# Patient Record
Sex: FEMALE | Race: BLACK OR AFRICAN AMERICAN | NOT HISPANIC OR LATINO | URBAN - METROPOLITAN AREA
[De-identification: names, ages, dates, MRNs, and addresses within clinical notes are randomized per-mention and may not be internally consistent; named-entity substitution may affect disease eponyms.]

---

## 2019-03-01 ENCOUNTER — INPATIENT (INPATIENT)
Facility: HOSPITAL | Age: 64
LOS: 2 days | Discharge: HOME | DRG: 247 | End: 2019-03-04
Attending: INTERNAL MEDICINE | Admitting: INTERNAL MEDICINE
Payer: COMMERCIAL

## 2019-03-01 ENCOUNTER — TRANSCRIPTION ENCOUNTER (OUTPATIENT)
Age: 64
End: 2019-03-01

## 2019-03-01 VITALS
HEART RATE: 64 BPM | DIASTOLIC BLOOD PRESSURE: 93 MMHG | OXYGEN SATURATION: 93 % | TEMPERATURE: 98 F | RESPIRATION RATE: 12 BRPM | SYSTOLIC BLOOD PRESSURE: 150 MMHG

## 2019-03-01 DIAGNOSIS — Z90.721 ACQUIRED ABSENCE OF OVARIES, UNILATERAL: Chronic | ICD-10-CM

## 2019-03-01 DIAGNOSIS — Z98.890 OTHER SPECIFIED POSTPROCEDURAL STATES: Chronic | ICD-10-CM

## 2019-03-01 DIAGNOSIS — I21.4 NON-ST ELEVATION (NSTEMI) MYOCARDIAL INFARCTION: ICD-10-CM

## 2019-03-01 LAB
ANION GAP SERPL CALC-SCNC: 13 MMOL/L — SIGNIFICANT CHANGE UP (ref 5–17)
APTT BLD: 37 SEC — HIGH (ref 27.5–36.3)
BLD GP AB SCN SERPL QL: SIGNIFICANT CHANGE UP
BUN SERPL-MCNC: 10 MG/DL — SIGNIFICANT CHANGE UP (ref 8–20)
CALCIUM SERPL-MCNC: 9.3 MG/DL — SIGNIFICANT CHANGE UP (ref 8.6–10.2)
CHLORIDE SERPL-SCNC: 99 MMOL/L — SIGNIFICANT CHANGE UP (ref 98–107)
CO2 SERPL-SCNC: 26 MMOL/L — SIGNIFICANT CHANGE UP (ref 22–29)
CREAT SERPL-MCNC: 0.78 MG/DL — SIGNIFICANT CHANGE UP (ref 0.5–1.3)
GLUCOSE SERPL-MCNC: 76 MG/DL — SIGNIFICANT CHANGE UP (ref 70–115)
HCT VFR BLD CALC: 48.2 % — HIGH (ref 37–47)
HGB BLD-MCNC: 16.1 G/DL — HIGH (ref 12–16)
INR BLD: 1.04 RATIO — SIGNIFICANT CHANGE UP (ref 0.88–1.16)
MCHC RBC-ENTMCNC: 29 PG — SIGNIFICANT CHANGE UP (ref 27–31)
MCHC RBC-ENTMCNC: 33.4 G/DL — SIGNIFICANT CHANGE UP (ref 32–36)
MCV RBC AUTO: 86.8 FL — SIGNIFICANT CHANGE UP (ref 81–99)
PLATELET # BLD AUTO: 425 K/UL — HIGH (ref 150–400)
POTASSIUM SERPL-MCNC: 4.1 MMOL/L — SIGNIFICANT CHANGE UP (ref 3.5–5.3)
POTASSIUM SERPL-SCNC: 4.1 MMOL/L — SIGNIFICANT CHANGE UP (ref 3.5–5.3)
PROTHROM AB SERPL-ACNC: 12 SEC — SIGNIFICANT CHANGE UP (ref 10–12.9)
RBC # BLD: 5.55 M/UL — HIGH (ref 4.4–5.2)
RBC # FLD: 14.5 % — SIGNIFICANT CHANGE UP (ref 11–15.6)
SODIUM SERPL-SCNC: 138 MMOL/L — SIGNIFICANT CHANGE UP (ref 135–145)
TYPE + AB SCN PNL BLD: SIGNIFICANT CHANGE UP
WBC # BLD: 4.4 K/UL — LOW (ref 4.8–10.8)
WBC # FLD AUTO: 4.4 K/UL — LOW (ref 4.8–10.8)

## 2019-03-01 PROCEDURE — 99152 MOD SED SAME PHYS/QHP 5/>YRS: CPT

## 2019-03-01 PROCEDURE — 99223 1ST HOSP IP/OBS HIGH 75: CPT

## 2019-03-01 PROCEDURE — 92978 ENDOLUMINL IVUS OCT C 1ST: CPT | Mod: 26,RC

## 2019-03-01 PROCEDURE — 76937 US GUIDE VASCULAR ACCESS: CPT | Mod: 26

## 2019-03-01 PROCEDURE — 93010 ELECTROCARDIOGRAM REPORT: CPT | Mod: 76

## 2019-03-01 PROCEDURE — 92941 PRQ TRLML REVSC TOT OCCL AMI: CPT | Mod: RC

## 2019-03-01 RX ORDER — LEVOTHYROXINE SODIUM 125 MCG
25 TABLET ORAL DAILY
Qty: 0 | Refills: 0 | Status: DISCONTINUED | OUTPATIENT
Start: 2019-03-01 | End: 2019-03-04

## 2019-03-01 RX ORDER — IPRATROPIUM/ALBUTEROL SULFATE 18-103MCG
3 AEROSOL WITH ADAPTER (GRAM) INHALATION ONCE
Qty: 0 | Refills: 0 | Status: COMPLETED | OUTPATIENT
Start: 2019-03-01 | End: 2019-03-01

## 2019-03-01 RX ORDER — METOPROLOL TARTRATE 50 MG
12.5 TABLET ORAL EVERY 12 HOURS
Qty: 0 | Refills: 0 | Status: DISCONTINUED | OUTPATIENT
Start: 2019-03-01 | End: 2019-03-01

## 2019-03-01 RX ORDER — LEVOTHYROXINE SODIUM 125 MCG
1 TABLET ORAL
Qty: 0 | Refills: 0 | COMMUNITY

## 2019-03-01 RX ORDER — ERGOCALCIFEROL 1.25 MG/1
1 CAPSULE ORAL
Qty: 0 | Refills: 0 | COMMUNITY

## 2019-03-01 RX ORDER — ALBUTEROL 90 UG/1
3 AEROSOL, METERED ORAL
Qty: 0 | Refills: 0 | COMMUNITY

## 2019-03-01 RX ORDER — CLOPIDOGREL BISULFATE 75 MG/1
75 TABLET, FILM COATED ORAL DAILY
Qty: 0 | Refills: 0 | Status: DISCONTINUED | OUTPATIENT
Start: 2019-03-02 | End: 2019-03-04

## 2019-03-01 RX ORDER — ATORVASTATIN CALCIUM 80 MG/1
80 TABLET, FILM COATED ORAL AT BEDTIME
Qty: 0 | Refills: 0 | Status: DISCONTINUED | OUTPATIENT
Start: 2019-03-01 | End: 2019-03-04

## 2019-03-01 RX ORDER — LISINOPRIL 2.5 MG/1
5 TABLET ORAL DAILY
Qty: 0 | Refills: 0 | Status: DISCONTINUED | OUTPATIENT
Start: 2019-03-01 | End: 2019-03-04

## 2019-03-01 RX ORDER — PANTOPRAZOLE SODIUM 20 MG/1
1 TABLET, DELAYED RELEASE ORAL
Qty: 0 | Refills: 0 | COMMUNITY

## 2019-03-01 RX ORDER — PANTOPRAZOLE SODIUM 20 MG/1
40 TABLET, DELAYED RELEASE ORAL
Qty: 0 | Refills: 0 | Status: DISCONTINUED | OUTPATIENT
Start: 2019-03-01 | End: 2019-03-04

## 2019-03-01 RX ORDER — SENNA PLUS 8.6 MG/1
2 TABLET ORAL AT BEDTIME
Qty: 0 | Refills: 0 | Status: DISCONTINUED | OUTPATIENT
Start: 2019-03-01 | End: 2019-03-04

## 2019-03-01 RX ORDER — METOPROLOL TARTRATE 50 MG
12.5 TABLET ORAL
Qty: 0 | Refills: 0 | Status: DISCONTINUED | OUTPATIENT
Start: 2019-03-01 | End: 2019-03-04

## 2019-03-01 RX ORDER — ASPIRIN/CALCIUM CARB/MAGNESIUM 324 MG
81 TABLET ORAL DAILY
Qty: 0 | Refills: 0 | Status: DISCONTINUED | OUTPATIENT
Start: 2019-03-02 | End: 2019-03-04

## 2019-03-01 RX ORDER — CLOPIDOGREL BISULFATE 75 MG/1
300 TABLET, FILM COATED ORAL ONCE
Qty: 0 | Refills: 0 | Status: COMPLETED | OUTPATIENT
Start: 2019-03-01 | End: 2019-03-01

## 2019-03-01 RX ORDER — IPRATROPIUM/ALBUTEROL SULFATE 18-103MCG
3 AEROSOL WITH ADAPTER (GRAM) INHALATION EVERY 6 HOURS
Qty: 0 | Refills: 0 | Status: DISCONTINUED | OUTPATIENT
Start: 2019-03-01 | End: 2019-03-04

## 2019-03-01 RX ADMIN — CLOPIDOGREL BISULFATE 300 MILLIGRAM(S): 75 TABLET, FILM COATED ORAL at 19:58

## 2019-03-01 RX ADMIN — Medication 12.5 MILLIGRAM(S): at 18:50

## 2019-03-01 RX ADMIN — ATORVASTATIN CALCIUM 80 MILLIGRAM(S): 80 TABLET, FILM COATED ORAL at 21:31

## 2019-03-01 RX ADMIN — Medication 3 MILLILITER(S): at 18:04

## 2019-03-01 RX ADMIN — SENNA PLUS 2 TABLET(S): 8.6 TABLET ORAL at 21:31

## 2019-03-01 NOTE — DISCHARGE NOTE ADULT - ADDITIONAL INSTRUCTIONS
Follow up with Dr. Princess Melton at [facility] on 3/12/19 at 1pm Follow up with Dr. Princess Melton at [facility] on 3/12/19 at 1pm  bring al medication bottle and discharge papers to all health care visits.

## 2019-03-01 NOTE — DISCHARGE NOTE ADULT - CARE PLAN
Principal Discharge DX:	NSTEMI (non-ST elevated myocardial infarction)  Goal:	Remain free of worsening CAD  Assessment and plan of treatment:	No heavy lifting, driving, sex, tub baths, swimming, or any activity that submerges the lower half of the body in water for 48 hours.  Limited walking and stairs for 48 hours.    Change the bandaid after 24 hours and every 24 hours after that.  Keep the puncture site dry and covered with a bandaid until a scab forms.    Observe the site frequently.  If bleeding or a large lump (the size of a golf ball or bigger) occurs lie flat, apply continuous direct pressure just above the puncture site for at least 10 minutes, and notify your physician immediately.  If the bleeding cannot be controlled, call 911 immediately for assistance.  Notify your physician of pain, swelling or any drainage.    Notify your physician immediately if coldness, numbness, discoloration or pain in your foot occurs. Principal Discharge DX:	NSTEMI (non-ST elevated myocardial infarction)  Goal:	Remain free of worsening CAD  Assessment and plan of treatment:	No heavy lifting, driving, sex, tub baths, swimming, or any activity that submerges the lower half of the body in water for 48 hours.  Limited walking and stairs for 48 hours.    Change the bandaid after 24 hours and every 24 hours after that.  Keep the puncture site dry and covered with a bandaid until a scab forms.    Observe the site frequently.  If bleeding or a large lump (the size of a golf ball or bigger) occurs lie flat, apply continuous direct pressure just above the puncture site for at least 10 minutes, and notify your physician immediately.  If the bleeding cannot be controlled, call 911 immediately for assistance.  Notify your physician of pain, swelling or any drainage.    Notify your physician immediately if coldness, numbness, discoloration or pain in your foot occurs.  Follow up with Dr. Princess Melton at [facility] on 3/12/19 at 1pm   see primary care doctor in 2-3 days. ask your program to arrange primary care doctor appointment closer to your house.   call 911 if chest pain, shortness of breadth, palpitation, blood in stool or black stool or any other concerning symptoms.  Secondary Diagnosis:	Chronic obstructive pulmonary disease, unspecified COPD type  Assessment and plan of treatment:	continue inhaler. see primary MD  Secondary Diagnosis:	Acquired hypothyroidism  Assessment and plan of treatment:	continue med. see primary MD  Secondary Diagnosis:	Schizoaffective disorder, unspecified type  Assessment and plan of treatment:	see psychiatrist, call 911 if suicidal

## 2019-03-01 NOTE — DISCHARGE NOTE ADULT - PROVIDER TOKENS
FREE:[LAST:[Geronimo],FIRST:[Princess],PHONE:[(   )    -],FAX:[(   )    -],ADDRESS:[[facility]   [address] [place] [address]]]

## 2019-03-01 NOTE — DISCHARGE NOTE ADULT - SECONDARY DIAGNOSIS.
Chronic obstructive pulmonary disease, unspecified COPD type Acquired hypothyroidism Schizoaffective disorder, unspecified type

## 2019-03-01 NOTE — H&P PST ADULT - ATTENDING COMMENTS
I saw and examined the patient, and were physically present for the key portions of the Evaluation and Management service provided. I agree with the above history, physical, and plan which I have reviewed and edited where appropriate.    64 y/o homeless F (currently living her sister) with h/o substance abuse, schizoaffective disorder, COPD, HTN, Hypothyroid was admitted to [facility] with CP, NSTEMI. Underwent Diagnostic cath on 2/28/19 at [facility]. Was transferred to [facility] Cath lab directly and underwent cath with stenting on 3/1/19. Given her social issues she was put on asa and plavix as DAPT. And will need  intervention for discharge.    CC- NSTEMI, CAD  P/E- comfortable, cath site clean, mitzy CTA with mildly reduced air flow. no rales or rhonchi,   S1 S2 + SM +  A/P- NSTEMI- s/p itnervention. asa, plavix, statin, acei  HTN- controlled  COPD- not in exacerbation. Duonebs PRN  Schizoaffective d/o- pt doesn't know what meds she is on/ if any.

## 2019-03-01 NOTE — DISCHARGE NOTE ADULT - CARE PROVIDER_API CALL
Princess Melton  [facility]   [address] [place] [address]  Phone: (   )    -  Fax: (   )    -  Follow Up Time:

## 2019-03-01 NOTE — DISCHARGE NOTE ADULT - PATIENT PORTAL LINK FT
You can access the [facility] Patient Portal, offered by [facility], by registering with the following website: [email or web address]

## 2019-03-01 NOTE — H&P PST ADULT - HISTORY OF PRESENT ILLNESS
This is a 62 y/o female with h/o HTN, COPD, schizoaffective disorder, hypothryoidism who presented to [facility] 2/27/19 with c/o chest pain.  She said the pain had been substernal and was initially stabbing for the past 3 weeks but increased in intensity the day she went to [facility].  She was admitted with NSTEMI and LHC revealed 40% pRCA eccentric hazy lesion with a large filling defect c/w thrombus with YAYA III flow in the distal vessel and 30% oLCX.  LV gram revealed EF 50% with moderate hypokinesis of the mid inferior wall.  Patient treated with heparin and integrilin infusion, plavix 300 mg load, baby asa, lipitor 80mg daily, metoprolol, and ACEI.  Patient transferred to [facility] for possible PCI with Dr. Mahan.

## 2019-03-01 NOTE — DISCHARGE NOTE ADULT - HOSPITAL COURSE
This is a 64 y/o female with h/o HTN, COPD, schizoaffective disorder, hypothryoidism who presented to [facility] 2/27/19 with c/o chest pain.  She said the pain had been substernal and was initially stabbing for the past 3 weeks but increased in intensity the day she went to [facility].  She was admitted with NSTEMI and LHC revealed 40% pRCA eccentric hazy lesion with a large filling defect c/w thrombus with YAYA III flow in the distal vessel and 30% oLCX.  LV gram revealed EF 50% with moderate hypokinesis of the mid inferior wall.  Patient treated with heparin and integrilin infusion, plavix 300 mg load, baby asa, lipitor 80mg daily, metoprolol, and ACEI.  Patient transferred to [facility] for possible PCI with Dr. Mahan. (01 Mar 2019 14:50)  Interval: s/p RCA stebt 3/1, CP better after stent but mild discomfort over central chest constant for past couple of days without radiation, not associated with mild SOB. no palpitation/presyncope/syncope. no sweating, not feeling hot today, no tremor/diarrhoea/. no other symptoms. seen by cardio. placed on cardiac monitor. Tele: no event. trop coming down  tele: TWI. otherwise no abn  64 y/o female with h/o HTN, COPD, schizoaffective disorder, hypothryoidism a/w NSTEMI. s/p RCA stent 3/1    NSTEMI, CAD  CP+ constant since admission. trop coming down  EKG: same as before  c/w DAPT, BB, statin, ACEI  monitor cath site: no hematoma  DC tele or DG of not dced today  cardio cleared for DC    Epistaxis resolved      HTN- controlled    COPD- not in exacerbation. Duonebs PRN. DC with Spiriva and PRoAir. See PCP in 2-3 days    Schizoaffective d/o- pt doesn't know what meds she is on/ if any.    hypomg: supplemented monitor This is a 62 y/o female with h/o HTN, COPD, schizoaffective disorder, hypothryoidism who presented to [facility] 2/27/19 with c/o chest pain.  She said the pain had been substernal and was initially stabbing for the past 3 weeks but increased in intensity the day she went to [facility].  She was admitted with NSTEMI and LHC revealed 40% pRCA eccentric hazy lesion with a large filling defect c/w thrombus with YAYA III flow in the distal vessel and 30% oLCX.  LV gram revealed EF 50% with moderate hypokinesis of the mid inferior wall.  Patient treated with heparin and integrilin infusion, plavix 300 mg load, baby asa, lipitor 80mg daily, metoprolol, and ACEI.  Patient transferred to [facility] for possible PCI with Dr. Mahan. (01 Mar 2019 14:50)  Interval: s/p RCA stebt 3/1, CP better after stent but mild discomfort over central chest constant for past couple of days without radiation, not associated with mild SOB. no palpitation/presyncope/syncope. no sweating, not feeling hot today, no tremor/diarrhoea/. no other symptoms. seen by cardio. placed on cardiac monitor. Tele: no event. trop coming down  tele: TWI. otherwise no abn  62 y/o female with h/o HTN, COPD, schizoaffective disorder, hypothryoidism a/w NSTEMI. s/p RCA stent 3/1    NSTEMI, CAD  CP+ constant since admission. trop coming down  EKG: same as before  c/w DAPT, BB, statin, ACEI  monitor cath site: no hematoma  DC tele or DG of not dced today  cardio cleared for DC    Epistaxis resolved      HTN- controlled    COPD- not in exacerbation. Duonebs PRN. DC with Incruse ellipta and PRoAir. See PCP in 2-3 days    Schizoaffective d/o- pt doesn't know what meds she is on/ if any.    hypomg: supplemented monitor    gait abnormality: use cane while walking, seen by PT. no skilled PT need    plan of care discussed with patient, return precautions discussed patient verbalized understanding    Dispo: patient lives with her sister. goes to [employer] by herself. no one to pick her up and she states he goes back and forth from the program and house without difficulty. sister is at home and will receive her at home. wanted to leave on Taxi.

## 2019-03-01 NOTE — H&P PST ADULT - PMH
COPD (chronic obstructive pulmonary disease)    Depression    Diabetes mellitus    Gastritis    Heroin abuse    Hypertension    Hypothyroidism    Opioid dependence    Schizoaffective disorder

## 2019-03-01 NOTE — DISCHARGE NOTE ADULT - MEDICATION SUMMARY - MEDICATIONS TO TAKE
I will START or STAY ON the medications listed below when I get home from the hospital:    DME: cane  -- gait abnormality. R26.2  -- Indication: For Gait abnormality    aspirin 81 mg oral tablet, chewable  -- 1 tab(s) by mouth once a day  -- Indication: For Non-ST elevation myocardial infarction (NSTEMI)    lisinopril 5 mg oral tablet  -- 1 tab(s) by mouth once a day  -- Indication: For Hypertension    atorvastatin 80 mg oral tablet  -- 1 tab(s) by mouth once a day (at bedtime)  -- Indication: For Non-ST elevation myocardial infarction (NSTEMI)    clopidogrel 75 mg oral tablet  -- 1 tab(s) by mouth once a day  -- Indication: For Non-ST elevation myocardial infarction (NSTEMI)    metoprolol tartrate 25 mg oral tablet  -- 12.5 milligram(s) by mouth 2 times a day   -- It is very important that you take or use this exactly as directed.  Do not skip doses or discontinue unless directed by your doctor.  May cause drowsiness.  Alcohol may intensify this effect.  Use care when operating dangerous machinery.  Some non-prescription drugs may aggravate your condition.  Read all labels carefully.  If a warning appears, check with your doctor before taking.  Take with food or milk.  This drug may impair the ability to drive or operate machinery.  Use care until you become familiar with its effects.    -- Indication: For Non-ST elevation myocardial infarction (NSTEMI)    Incruse Ellipta 62.5 mcg/inh inhalation powder  -- 1 inhaler(s) inhaled once a day   -- Check with your doctor before becoming pregnant.  For inhalation only.  It is very important that you take or use this exactly as directed.  Do not skip doses or discontinue unless directed by your doctor.  Obtain medical advice before taking any non-prescription drugs as some may affect the action of this medication.    -- Indication: For COPD (chronic obstructive pulmonary disease)    albuterol 90 mcg/inh inhalation powder  -- 2-4 puff(s) inhaled every 6 hours PRN for SOB/wheezing  -- For inhalation only.  It is very important that you take or use this exactly as directed.  Do not skip doses or discontinue unless directed by your doctor.  Obtain medical advice before taking any non-prescription drugs as some may affect the action of this medication.    -- Indication: For COPD (chronic obstructive pulmonary disease)    albuterol 2.5 mg/3 mL (0.083%) inhalation solution  -- 3 milliliter(s) inhaled every 6 hours  -- Indication: For COPD (chronic obstructive pulmonary disease)    guaiFENesin 100 mg/5 mL oral liquid  -- 10 milliliter(s) by mouth every 6 hours, As needed, Cough  -- Indication: For COugh    senna oral tablet  -- 2 tab(s) by mouth once a day (at bedtime) as needed for constipation  -- Indication: For COnstipation    pantoprazole 40 mg oral delayed release tablet  -- 1 tab(s) by mouth once a day  -- Indication: For Dyspepsia    levothyroxine 25 mcg (0.025 mg) oral tablet  -- 1 tab(s) by mouth once a day  -- Indication: For Hypothyroidism    Vitamin D2 50,000 intl units (1.25 mg) oral capsule  -- 1 cap(s) by mouth once a week  -- Indication: For vitamin

## 2019-03-01 NOTE — DISCHARGE NOTE ADULT - PLAN OF CARE
Remain free of worsening CAD No heavy lifting, driving, sex, tub baths, swimming, or any activity that submerges the lower half of the body in water for 48 hours.  Limited walking and stairs for 48 hours.    Change the bandaid after 24 hours and every 24 hours after that.  Keep the puncture site dry and covered with a bandaid until a scab forms.    Observe the site frequently.  If bleeding or a large lump (the size of a golf ball or bigger) occurs lie flat, apply continuous direct pressure just above the puncture site for at least 10 minutes, and notify your physician immediately.  If the bleeding cannot be controlled, call 911 immediately for assistance.  Notify your physician of pain, swelling or any drainage.    Notify your physician immediately if coldness, numbness, discoloration or pain in your foot occurs. No heavy lifting, driving, sex, tub baths, swimming, or any activity that submerges the lower half of the body in water for 48 hours.  Limited walking and stairs for 48 hours.    Change the bandaid after 24 hours and every 24 hours after that.  Keep the puncture site dry and covered with a bandaid until a scab forms.    Observe the site frequently.  If bleeding or a large lump (the size of a golf ball or bigger) occurs lie flat, apply continuous direct pressure just above the puncture site for at least 10 minutes, and notify your physician immediately.  If the bleeding cannot be controlled, call 911 immediately for assistance.  Notify your physician of pain, swelling or any drainage.    Notify your physician immediately if coldness, numbness, discoloration or pain in your foot occurs.  Follow up with Dr. Princess Melton at [facility] on 3/12/19 at 1pm   see primary care doctor in 2-3 days. ask your program to arrange primary care doctor appointment closer to your house.   call 911 if chest pain, shortness of breadth, palpitation, blood in stool or black stool or any other concerning symptoms. continue inhaler. see primary MD continue med. see primary MD see psychiatrist, call 911 if suicidal

## 2019-03-01 NOTE — PROGRESS NOTE ADULT - SUBJECTIVE AND OBJECTIVE BOX
Cardiology NP note:     Pt s/p TERESA x 1 to RCA. Denies complaint.     EKG: NSR 80 Flipped T wave III and AVF  TELE: NSR 80s    MEDICATIONS  (STANDING):  atorvastatin 80 milliGRAM(s) Oral at bedtime  levothyroxine 25 MICROGram(s) Oral daily  lisinopril 5 milliGRAM(s) Oral daily  metoprolol tartrate Suspension 12.5 milliGRAM(s) Oral every 12 hours  pantoprazole    Tablet 40 milliGRAM(s) Oral before breakfast  senna 2 Tablet(s) Oral at bedtime    MEDICATIONS  (PRN):  ALBUTerol/ipratropium for Nebulization 3 milliLiter(s) Nebulizer every 6 hours PRN Shortness of Breath and/or Wheezing      Allergies:  penicillin (Unknown)      PAST MEDICAL & SURGICAL HISTORY:  Heroin abuse  Opioid dependence  Depression  Schizoaffective disorder  Gastritis  Diabetes mellitus  Hypothyroidism  COPD (chronic obstructive pulmonary disease)  Hypertension  History of unilateral oophorectomy  H/O major orthopedic surgery: Right LLE s/p trauma/fracture      Vital Signs Last 24 Hrs  T(C): 36.7 (01 Mar 2019 14:18), Max: 36.7 (01 Mar 2019 14:18)  T(F): 98 (01 Mar 2019 14:18), Max: 98 (01 Mar 2019 14:18)  HR: 68 (01 Mar 2019 17:20) (61 - 68)  BP: 152/90 (01 Mar 2019 17:20) (142/90 - 157/91)  BP(mean): --  RR: 12 (01 Mar 2019 17:20) (12 - 12)  SpO2: 97% (01 Mar 2019 17:20) (93% - 100%)    Physical Exam:  Constitutional: NAD, AAOx3  Cardiovascular: +S1S2 RRR  Pulmonary: diminished bilaterally with poor air movement; unlabored  Abd: soft NTND +BS  Groin: Right FA angioseal site benign C/D/I ; no bleeding, hematoma, edema  Extremities: no pedal edema, +distal pulses b/l  Neuro: non focal, SANABRIA x4    LABS:                        16.1   4.4   )-----------( 425      ( 01 Mar 2019 16:28 )             48.2     03-01    138  |  99  |  10.0  ----------------------------<  76  4.1   |  26.0  |  0.78    Ca    9.3      01 Mar 2019 16:28      PT/INR - ( 01 Mar 2019 16:28 )   PT: 12.0 sec;   INR: 1.04 ratio         PTT - ( 01 Mar 2019 16:28 )  PTT:37.0 sec      Assessment:   *** y/o female h/o ***; now POD #1 s/p ***.     Plan:     Access site care and activity limitations reviewed w/ pt.   Outpt f/up in 4-6 weeks. Cardiology NP note:     Pt s/p NSTEMI/TERESA x 1 to RCA. Denies complaint.     EKG: NSR 80 Flipped T wave III and AVF  TELE: NSR 80s    MEDICATIONS  (STANDING):  atorvastatin 80 milliGRAM(s) Oral at bedtime  levothyroxine 25 MICROGram(s) Oral daily  lisinopril 5 milliGRAM(s) Oral daily  metoprolol tartrate Suspension 12.5 milliGRAM(s) Oral every 12 hours  pantoprazole    Tablet 40 milliGRAM(s) Oral before breakfast  senna 2 Tablet(s) Oral at bedtime    MEDICATIONS  (PRN):  ALBUTerol/ipratropium for Nebulization 3 milliLiter(s) Nebulizer every 6 hours PRN Shortness of Breath and/or Wheezing      Allergies:  penicillin (Unknown)      PAST MEDICAL & SURGICAL HISTORY:  Heroin abuse  Opioid dependence  Depression  Schizoaffective disorder  Gastritis  Diabetes mellitus  Hypothyroidism  COPD (chronic obstructive pulmonary disease)  Hypertension  History of unilateral oophorectomy  H/O major orthopedic surgery: Right LLE s/p trauma/fracture      Vital Signs Last 24 Hrs  T(C): 36.7 (01 Mar 2019 14:18), Max: 36.7 (01 Mar 2019 14:18)  T(F): 98 (01 Mar 2019 14:18), Max: 98 (01 Mar 2019 14:18)  HR: 68 (01 Mar 2019 17:20) (61 - 68)  BP: 152/90 (01 Mar 2019 17:20) (142/90 - 157/91)  BP(mean): --  RR: 12 (01 Mar 2019 17:20) (12 - 12)  SpO2: 97% (01 Mar 2019 17:20) (93% - 100%)    Physical Exam:  Constitutional: NAD, AAOx3  Cardiovascular: +S1S2 RRR  Pulmonary: diminished bilaterally with poor air movement; unlabored  Abd: soft NTND +BS  Groin: Right FA angioseal site benign C/D/I ; no bleeding, hematoma, edema  Extremities: no pedal edema, +distal pulses b/l  Neuro: non focal, SANABRIA x4    LABS:                        16.1   4.4   )-----------( 425      ( 01 Mar 2019 16:28 )             48.2     03-01    138  |  99  |  10.0  ----------------------------<  76  4.1   |  26.0  |  0.78    Ca    9.3      01 Mar 2019 16:28      PT/INR - ( 01 Mar 2019 16:28 )   PT: 12.0 sec;   INR: 1.04 ratio         PTT - ( 01 Mar 2019 16:28 )  PTT:37.0 sec      Assessment:   This is a 62 y/o female with h/o HTN, COPD, schizoaffective disorder, hypothryoidism who presented to [facility] 2/27/19 with c/o chest pain.  She said the pain had been substernal and was initially stabbing for the past 3 weeks but increased in intensity the day she went to [facility].  She was admitted with NSTEMI and LHC revealed 40% pRCA eccentric hazy lesion with a large filling defect c/w thrombus with YAYA III flow in the distal vessel and 30% oLCX.  LV gram revealed EF 50% with moderate hypokinesis of the mid inferior wall.  Patient treated with heparin and integrilin infusion, plavix 300 mg load, baby asa, lipitor 80mg daily, metoprolol, and ACEI.  Patient transferred to [facility] and is now s/p TERESA x 1 to RCA by Dr. Mahan.    Plan:   -Admit to Tele (Dr. Reinoso's service) secondary to meeting admission criteria of ACS/NSTEMI/need for treatment of COPD  -If stable, possible discharge home in the am   -Social work consult for discharge placement (homeless)  -Access site care and activity limitations reviewed w/ pt.   -Benefits of ASA/Plavix emphasized with patient verbal understanding  -Follow up with Dr. Princess Melton at [facility] on 3/12/19 at 1pm  -Meds: ASA/Plavix/statin/beta blocker/acei  -Smoking cessation emphasized with patient verbal understanding  -Discussed with Dr. Reinoso and Dr. Mahan

## 2019-03-02 LAB
ABO RH CONFIRMATION: SIGNIFICANT CHANGE UP
ALBUMIN SERPL ELPH-MCNC: 3.4 G/DL — SIGNIFICANT CHANGE UP (ref 3.3–5.2)
ALP SERPL-CCNC: 79 U/L — SIGNIFICANT CHANGE UP (ref 40–120)
ALT FLD-CCNC: 9 U/L — SIGNIFICANT CHANGE UP
ANION GAP SERPL CALC-SCNC: 12 MMOL/L — SIGNIFICANT CHANGE UP (ref 5–17)
AST SERPL-CCNC: 18 U/L — SIGNIFICANT CHANGE UP
BASOPHILS # BLD AUTO: 0 K/UL — SIGNIFICANT CHANGE UP (ref 0–0.2)
BASOPHILS NFR BLD AUTO: 0.4 % — SIGNIFICANT CHANGE UP (ref 0–2)
BILIRUB DIRECT SERPL-MCNC: 0.1 MG/DL — SIGNIFICANT CHANGE UP (ref 0–0.3)
BILIRUB INDIRECT FLD-MCNC: 0.2 MG/DL — SIGNIFICANT CHANGE UP (ref 0.2–1)
BILIRUB SERPL-MCNC: 0.3 MG/DL — LOW (ref 0.4–2)
BUN SERPL-MCNC: 14 MG/DL — SIGNIFICANT CHANGE UP (ref 8–20)
CALCIUM SERPL-MCNC: 9.3 MG/DL — SIGNIFICANT CHANGE UP (ref 8.6–10.2)
CHLORIDE SERPL-SCNC: 100 MMOL/L — SIGNIFICANT CHANGE UP (ref 98–107)
CHOLEST SERPL-MCNC: 148 MG/DL — SIGNIFICANT CHANGE UP (ref 110–199)
CO2 SERPL-SCNC: 26 MMOL/L — SIGNIFICANT CHANGE UP (ref 22–29)
CREAT SERPL-MCNC: 0.77 MG/DL — SIGNIFICANT CHANGE UP (ref 0.5–1.3)
EOSINOPHIL # BLD AUTO: 0.1 K/UL — SIGNIFICANT CHANGE UP (ref 0–0.5)
EOSINOPHIL NFR BLD AUTO: 2.2 % — SIGNIFICANT CHANGE UP (ref 0–6)
GLUCOSE SERPL-MCNC: 92 MG/DL — SIGNIFICANT CHANGE UP (ref 70–115)
HCT VFR BLD CALC: 47.1 % — HIGH (ref 37–47)
HDLC SERPL-MCNC: 58 MG/DL — SIGNIFICANT CHANGE UP
HGB BLD-MCNC: 15.5 G/DL — SIGNIFICANT CHANGE UP (ref 12–16)
LIPID PNL WITH DIRECT LDL SERPL: 72 MG/DL — SIGNIFICANT CHANGE UP
LYMPHOCYTES # BLD AUTO: 1.4 K/UL — SIGNIFICANT CHANGE UP (ref 1–4.8)
LYMPHOCYTES # BLD AUTO: 27.1 % — SIGNIFICANT CHANGE UP (ref 20–55)
MCHC RBC-ENTMCNC: 29 PG — SIGNIFICANT CHANGE UP (ref 27–31)
MCHC RBC-ENTMCNC: 32.9 G/DL — SIGNIFICANT CHANGE UP (ref 32–36)
MCV RBC AUTO: 88 FL — SIGNIFICANT CHANGE UP (ref 81–99)
MONOCYTES # BLD AUTO: 0.6 K/UL — SIGNIFICANT CHANGE UP (ref 0–0.8)
MONOCYTES NFR BLD AUTO: 10.9 % — HIGH (ref 3–10)
NEUTROPHILS # BLD AUTO: 3 K/UL — SIGNIFICANT CHANGE UP (ref 1.8–8)
NEUTROPHILS NFR BLD AUTO: 59.2 % — SIGNIFICANT CHANGE UP (ref 37–73)
PLATELET # BLD AUTO: 453 K/UL — HIGH (ref 150–400)
POTASSIUM SERPL-MCNC: 3.6 MMOL/L — SIGNIFICANT CHANGE UP (ref 3.5–5.3)
POTASSIUM SERPL-SCNC: 3.6 MMOL/L — SIGNIFICANT CHANGE UP (ref 3.5–5.3)
PROT SERPL-MCNC: 7 G/DL — SIGNIFICANT CHANGE UP (ref 6.6–8.7)
RBC # BLD: 5.35 M/UL — HIGH (ref 4.4–5.2)
RBC # FLD: 14.5 % — SIGNIFICANT CHANGE UP (ref 11–15.6)
SODIUM SERPL-SCNC: 138 MMOL/L — SIGNIFICANT CHANGE UP (ref 135–145)
TOTAL CHOLESTEROL/HDL RATIO MEASUREMENT: 3 RATIO — LOW (ref 3.3–7.1)
TRIGL SERPL-MCNC: 91 MG/DL — SIGNIFICANT CHANGE UP (ref 10–200)
TSH SERPL-MCNC: 3.18 UIU/ML — SIGNIFICANT CHANGE UP (ref 0.27–4.2)
WBC # BLD: 5.1 K/UL — SIGNIFICANT CHANGE UP (ref 4.8–10.8)
WBC # FLD AUTO: 5.1 K/UL — SIGNIFICANT CHANGE UP (ref 4.8–10.8)

## 2019-03-02 PROCEDURE — 93010 ELECTROCARDIOGRAM REPORT: CPT

## 2019-03-02 PROCEDURE — 99233 SBSQ HOSP IP/OBS HIGH 50: CPT

## 2019-03-02 RX ORDER — POTASSIUM CHLORIDE 20 MEQ
40 PACKET (EA) ORAL ONCE
Qty: 0 | Refills: 0 | Status: COMPLETED | OUTPATIENT
Start: 2019-03-02 | End: 2019-03-02

## 2019-03-02 RX ADMIN — CLOPIDOGREL BISULFATE 75 MILLIGRAM(S): 75 TABLET, FILM COATED ORAL at 11:38

## 2019-03-02 RX ADMIN — PANTOPRAZOLE SODIUM 40 MILLIGRAM(S): 20 TABLET, DELAYED RELEASE ORAL at 06:56

## 2019-03-02 RX ADMIN — Medication 81 MILLIGRAM(S): at 11:38

## 2019-03-02 RX ADMIN — Medication 25 MICROGRAM(S): at 06:55

## 2019-03-02 RX ADMIN — Medication 12.5 MILLIGRAM(S): at 06:56

## 2019-03-02 RX ADMIN — ATORVASTATIN CALCIUM 80 MILLIGRAM(S): 80 TABLET, FILM COATED ORAL at 21:41

## 2019-03-02 RX ADMIN — Medication 3 MILLILITER(S): at 10:25

## 2019-03-02 RX ADMIN — LISINOPRIL 5 MILLIGRAM(S): 2.5 TABLET ORAL at 06:56

## 2019-03-02 RX ADMIN — SENNA PLUS 2 TABLET(S): 8.6 TABLET ORAL at 21:41

## 2019-03-02 RX ADMIN — Medication 12.5 MILLIGRAM(S): at 17:48

## 2019-03-02 RX ADMIN — Medication 40 MILLIEQUIVALENT(S): at 11:38

## 2019-03-02 NOTE — PROGRESS NOTE ADULT - SUBJECTIVE AND OBJECTIVE BOX
Dr. Arango Hospitalist Progress Note  VLADIMIR CHAPARRO [id number]    Patient is a 63y old  Female who presents with a chief complaint of cp (02 Mar 2019 09:00)    HPI:  This is a 64 y/o female with h/o HTN, COPD, schizoaffective disorder, hypothryoidism who presented to [facility] 2/27/19 with c/o chest pain.  She said the pain had been substernal and was initially stabbing for the past 3 weeks but increased in intensity the day she went to [facility].  She was admitted with NSTEMI and LHC revealed 40% pRCA eccentric hazy lesion with a large filling defect c/w thrombus with YAYA III flow in the distal vessel and 30% oLCX.  LV gram revealed EF 50% with moderate hypokinesis of the mid inferior wall.  Patient treated with heparin and integrilin infusion, plavix 300 mg load, baby asa, lipitor 80mg daily, metoprolol, and ACEI.  Patient transferred to [facility] for possible PCI with Dr. Mahan. (01 Mar 2019 14:50)    Interval: s/p RCA stebt 3/1, CP better after stent. sweating+, feeling hot+, no tremor/diarrhoea/tremor. no other symptoms. seen by cardio. one episode of epistaxis last night. did not recur  homeless. pending  eval      ROS:  CONSTITUTIONAL:  No distress.no fever/chills/fatigue/weight loss  HEENT:  Eyes:  No diplopia or blurred vision.   CARDIOVASCULAR:  No pressure, squeezing, tightness, heaviness or aching about the chest; no palpitations.no leg swelling, no orthopnea or PND  RESPIRATORY:  no SOB. no wheezing.no cough or sputum.  No hemoptysis  GI: no nausea, no vomiting, no diarrhea, no constipation. No hematochezia or melena  EXT:No joint pain or joint swelling or redness  SKIN: no skin break or ulcer. No cellulitis.   CNS: No headaches. No weakness.no numbness. No depression or anxiety. No SI    T(C): 37 (03-02-19 @ 16:02), Max: 37 (03-02-19 @ 16:02)  HR: 70 (03-02-19 @ 16:02) (61 - 77)  BP: 120/60 (03-02-19 @ 16:02) (120/60 - 158/879)  RR: 18 (03-02-19 @ 16:02) (12 - 18)  SpO2: 94% (03-02-19 @ 11:39) (93% - 100%)  CAPILLARY BLOOD GLUCOSE          Physical Exam:  GENERAL: Not in distress. Alert    HEENT:  Normocephalic and atraumatic. PEARLA,EOMI  NECK: Supple.  No JVD.    CARDIOVASCULAR: RRR S1, S2. No murmur/rubs/gallop  LUNGS: BLAE+, no rales, no wheezing, no rhonchi.    ABDOMEN: ND. Soft,  NT, no guarding / rebound / rigidity. BS normoactive. No CVA tenderness.    BACK: No spine tenderness.  EXTREMITIES: no cyanosis, no clubbing, no edema.   SKIN: no rash. No skin break or ulcer. No cellulitis.  NEUROLOGIC: AAO*3.strength is symmetric, sensation intact, speech fluent.    PSYCHIATRIC: Calm.  No agitation.    Labs                        15.5   5.1   )-----------( 453      ( 02 Mar 2019 06:27 )             47.1     03-02    138  |  100  |  14.0  ----------------------------<  92  3.6   |  26.0  |  0.77    Ca    9.3      02 Mar 2019 06:27    TPro  7.0  /  Alb  3.4  /  TBili  0.3<L>  /  DBili  0.1  /  AST  18  /  ALT  9   /  AlkPhos  79  03-02   PT/INR - ( 01 Mar 2019 16:28 )   PT: 12.0 sec;   INR: 1.04 ratio         PTT - ( 01 Mar 2019 16:28 )  PTT:37.0 sec  MEDICATIONS  (STANDING):  aspirin  chewable 81 milliGRAM(s) Chew daily  atorvastatin 80 milliGRAM(s) Oral at bedtime  clopidogrel Tablet 75 milliGRAM(s) Oral daily  levothyroxine 25 MICROGram(s) Oral daily  lisinopril 5 milliGRAM(s) Oral daily  metoprolol tartrate 12.5 milliGRAM(s) Oral two times a day  pantoprazole    Tablet 40 milliGRAM(s) Oral before breakfast  senna 2 Tablet(s) Oral at bedtime    MEDICATIONS  (PRN):  ALBUTerol/ipratropium for Nebulization 3 milliLiter(s) Nebulizer every 6 hours PRN Shortness of Breath and/or Wheezing

## 2019-03-02 NOTE — PROGRESS NOTE ADULT - SUBJECTIVE AND OBJECTIVE BOX
Post Cath  Patient is a 63y old  Female who presents with a chief complaint of NSTEMI   No further chest discomfort  s/p cath TERESA to rca  Ef 50%  Had recurrent epistaxis last pm  Ekg  Nsr Estefania t inversion 2, 3 , avf v5 v6  No previous Ekg  LVH  Telemetry nsr no arrhythmias      MEDS  ALBUTerol/ipratropium for Nebulization 3 milliLiter(s) Nebulizer every 6 hours PRN  aspirin  chewable 81 milliGRAM(s) Chew daily  atorvastatin 80 milliGRAM(s) Oral at bedtime  clopidogrel Tablet 75 milliGRAM(s) Oral daily  levothyroxine 25 MICROGram(s) Oral daily  lisinopril 5 milliGRAM(s) Oral daily  metoprolol tartrate 12.5 milliGRAM(s) Oral two times a day  pantoprazole    Tablet 40 milliGRAM(s) Oral before breakfast  senna 2 Tablet(s) Oral at bedtime        03-02    138  |  100  |  14.0  ----------------------------<  92  3.6   |  26.0  |  0.77    Ca    9.3      02 Mar 2019 06:27    TPro  7.0  /  Alb  3.4  /  TBili  0.3<L>  /  DBili  0.1  /  AST  18  /  ALT  9   /  AlkPhos  79  03-02                          15.5   5.1   )-----------( 453      ( 02 Mar 2019 06:27 )             47.1     LIVER FUNCTIONS - ( 02 Mar 2019 06:27 )  Alb: 3.4 g/dL / Pro: 7.0 g/dL / ALK PHOS: 79 U/L / ALT: 9 U/L / AST: 18 U/L / GGT: x           T(C): 36.7 (03-02-19 @ 06:52), Max: 36.7 (03-01-19 @ 14:18)  HR: 70 (03-02-19 @ 07:33) (61 - 77)  BP: 124/80 (03-02-19 @ 06:52) (124/80 - 158/879)  RR: 16 (03-02-19 @ 06:52) (12 - 16)  SpO2: 93% (03-02-19 @ 06:52) (93% - 100%)      PE  Chest  Clear lung fields  Heart s1&s2 regular  Abd  soft active bowel sounds  EXT  No c/c/e  CATH SITE right groin soft good pulse  Neuro  Alert oriented Non focal exam    A/P    ASHD s/p stent    Advised importance of taking antiplatelet agent on  asa and plavix a regular basis  Low saturated fat diet discussed  Advised to follow up with Cardiologist within 2 weeks  Groin instructions given  Smoking cessations

## 2019-03-02 NOTE — PROGRESS NOTE ADULT - ASSESSMENT
64 y/o female with h/o HTN, COPD, schizoaffective disorder, hypothryoidism a/w NSTEMI. s/p RCA stent 3/1    NSTEMI, CAD  c/w DAPT, BB, statin, ACEI  monitor cath site as per cardio  cardio fu in 2 weeks      Epistaxis resolved      HTN- controlled    COPD- not in exacerbation. Duonebs PRN    Schizoaffective d/o- pt doesn't know what meds she is on/ if any.    Dispo: needs SW eval. needs placement.

## 2019-03-03 DIAGNOSIS — I21.4 NON-ST ELEVATION (NSTEMI) MYOCARDIAL INFARCTION: ICD-10-CM

## 2019-03-03 LAB
ANION GAP SERPL CALC-SCNC: 11 MMOL/L — SIGNIFICANT CHANGE UP (ref 5–17)
BUN SERPL-MCNC: 14 MG/DL — SIGNIFICANT CHANGE UP (ref 8–20)
CALCIUM SERPL-MCNC: 9.4 MG/DL — SIGNIFICANT CHANGE UP (ref 8.6–10.2)
CHLORIDE SERPL-SCNC: 99 MMOL/L — SIGNIFICANT CHANGE UP (ref 98–107)
CK SERPL-CCNC: 50 U/L — SIGNIFICANT CHANGE UP (ref 25–170)
CO2 SERPL-SCNC: 26 MMOL/L — SIGNIFICANT CHANGE UP (ref 22–29)
CREAT SERPL-MCNC: 0.78 MG/DL — SIGNIFICANT CHANGE UP (ref 0.5–1.3)
GLUCOSE SERPL-MCNC: 108 MG/DL — SIGNIFICANT CHANGE UP (ref 70–115)
HCT VFR BLD CALC: 47.2 % — HIGH (ref 37–47)
HGB BLD-MCNC: 15 G/DL — SIGNIFICANT CHANGE UP (ref 12–16)
MAGNESIUM SERPL-MCNC: 1.4 MG/DL — LOW (ref 1.6–2.6)
MCHC RBC-ENTMCNC: 27.7 PG — SIGNIFICANT CHANGE UP (ref 27–31)
MCHC RBC-ENTMCNC: 31.8 G/DL — LOW (ref 32–36)
MCV RBC AUTO: 87.2 FL — SIGNIFICANT CHANGE UP (ref 81–99)
PLATELET # BLD AUTO: 482 K/UL — HIGH (ref 150–400)
POTASSIUM SERPL-MCNC: 4.7 MMOL/L — SIGNIFICANT CHANGE UP (ref 3.5–5.3)
POTASSIUM SERPL-SCNC: 4.7 MMOL/L — SIGNIFICANT CHANGE UP (ref 3.5–5.3)
RBC # BLD: 5.41 M/UL — HIGH (ref 4.4–5.2)
RBC # FLD: 14.8 % — SIGNIFICANT CHANGE UP (ref 11–15.6)
SODIUM SERPL-SCNC: 136 MMOL/L — SIGNIFICANT CHANGE UP (ref 135–145)
TROPONIN T SERPL-MCNC: 0.3 NG/ML — HIGH (ref 0–0.06)
WBC # BLD: 5.2 K/UL — SIGNIFICANT CHANGE UP (ref 4.8–10.8)
WBC # FLD AUTO: 5.2 K/UL — SIGNIFICANT CHANGE UP (ref 4.8–10.8)

## 2019-03-03 PROCEDURE — 99232 SBSQ HOSP IP/OBS MODERATE 35: CPT

## 2019-03-03 PROCEDURE — 93010 ELECTROCARDIOGRAM REPORT: CPT

## 2019-03-03 RX ORDER — MAGNESIUM SULFATE 500 MG/ML
2 VIAL (ML) INJECTION ONCE
Qty: 0 | Refills: 0 | Status: COMPLETED | OUTPATIENT
Start: 2019-03-03 | End: 2019-03-03

## 2019-03-03 RX ORDER — HEPARIN SODIUM 5000 [USP'U]/ML
5000 INJECTION INTRAVENOUS; SUBCUTANEOUS EVERY 8 HOURS
Qty: 0 | Refills: 0 | Status: DISCONTINUED | OUTPATIENT
Start: 2019-03-03 | End: 2019-03-04

## 2019-03-03 RX ORDER — DIPHENHYDRAMINE HCL 50 MG
25 CAPSULE ORAL ONCE
Qty: 0 | Refills: 0 | Status: COMPLETED | OUTPATIENT
Start: 2019-03-03 | End: 2019-03-03

## 2019-03-03 RX ADMIN — Medication 50 GRAM(S): at 17:33

## 2019-03-03 RX ADMIN — Medication 12.5 MILLIGRAM(S): at 17:33

## 2019-03-03 RX ADMIN — Medication 50 GRAM(S): at 11:13

## 2019-03-03 RX ADMIN — LISINOPRIL 5 MILLIGRAM(S): 2.5 TABLET ORAL at 05:17

## 2019-03-03 RX ADMIN — PANTOPRAZOLE SODIUM 40 MILLIGRAM(S): 20 TABLET, DELAYED RELEASE ORAL at 05:17

## 2019-03-03 RX ADMIN — CLOPIDOGREL BISULFATE 75 MILLIGRAM(S): 75 TABLET, FILM COATED ORAL at 08:39

## 2019-03-03 RX ADMIN — Medication 81 MILLIGRAM(S): at 08:39

## 2019-03-03 RX ADMIN — ATORVASTATIN CALCIUM 80 MILLIGRAM(S): 80 TABLET, FILM COATED ORAL at 21:09

## 2019-03-03 RX ADMIN — SENNA PLUS 2 TABLET(S): 8.6 TABLET ORAL at 21:09

## 2019-03-03 RX ADMIN — HEPARIN SODIUM 5000 UNIT(S): 5000 INJECTION INTRAVENOUS; SUBCUTANEOUS at 21:09

## 2019-03-03 RX ADMIN — Medication 12.5 MILLIGRAM(S): at 05:17

## 2019-03-03 RX ADMIN — Medication 25 MICROGRAM(S): at 05:17

## 2019-03-03 RX ADMIN — Medication 25 MILLIGRAM(S): at 22:32

## 2019-03-03 NOTE — PROGRESS NOTE ADULT - SUBJECTIVE AND OBJECTIVE BOX
CC:  Patient is a 63y old  Female who presents with a chief complaint of CP, NSTEMI (02 Mar 2019 16:13)      HPI:  This is a 64 y/o female with h/o HTN, COPD, schizoaffective disorder, hypothryoidism who presented to [facility] 2/27/19 with c/o chest pain.  She said the pain had been substernal and was initially stabbing for the past 3 weeks but increased in intensity the day she went to [facility].  She was admitted with NSTEMI and LHC revealed 40% pRCA eccentric hazy lesion with a large filling defect c/w thrombus with YAYA III flow in the distal vessel and 30% oLCX.  LV gram revealed EF 50% with moderate hypokinesis of the mid inferior wall.  Patient treated with heparin and integrilin infusion, plavix 300 mg load, baby asa, lipitor 80mg daily, metoprolol, and ACEI.  Patient transferred to [facility] for possible PCI with Dr. Mahan. (01 Mar 2019 14:50)      ROS: All review of systems negative unless indicated otherwise above.     Lab Results:  CBC  CBC Full  -  ( 02 Mar 2019 06:27 )  WBC Count : 5.1 K/uL  Hemoglobin : 15.5 g/dL  Hematocrit : 47.1 %  Platelet Count - Automated : 453 K/uL  Mean Cell Volume : 88.0 fl  Mean Cell Hemoglobin : 29.0 pg  Mean Cell Hemoglobin Concentration : 32.9 g/dL  Auto Neutrophil # : 3.0 K/uL  Auto Lymphocyte # : 1.4 K/uL  Auto Monocyte # : 0.6 K/uL  Auto Eosinophil # : 0.1 K/uL  Auto Basophil # : 0.0 K/uL  Auto Neutrophil % : 59.2 %  Auto Lymphocyte % : 27.1 %  Auto Monocyte % : 10.9 %  Auto Eosinophil % : 2.2 %  Auto Basophil % : 0.4 %    .		Differential:	[] Automated		[] Manual  Chemistry                        15.5   5.1   )-----------( 453      ( 02 Mar 2019 06:27 )             47.1     03-02    138  |  100  |  14.0  ----------------------------<  92  3.6   |  26.0  |  0.77    Ca    9.3      02 Mar 2019 06:27    TPro  7.0  /  Alb  3.4  /  TBili  0.3<L>  /  DBili  0.1  /  AST  18  /  ALT  9   /  AlkPhos  79  03-02    LIVER FUNCTIONS - ( 02 Mar 2019 06:27 )  Alb: 3.4 g/dL / Pro: 7.0 g/dL / ALK PHOS: 79 U/L / ALT: 9 U/L / AST: 18 U/L / GGT: x           PT/INR - ( 01 Mar 2019 16:28 )   PT: 12.0 sec;   INR: 1.04 ratio         PTT - ( 01 Mar 2019 16:28 )  PTT:37.0 sec      CATH REPORT: Preliminary verbal report   a/w NSTEMI. s/p RCA stent 3/1    MEDICATIONS  (STANDING):  aspirin  chewable 81 milliGRAM(s) Chew daily  atorvastatin 80 milliGRAM(s) Oral at bedtime  clopidogrel Tablet 75 milliGRAM(s) Oral daily  levothyroxine 25 MICROGram(s) Oral daily  lisinopril 5 milliGRAM(s) Oral daily  metoprolol tartrate 12.5 milliGRAM(s) Oral two times a day  pantoprazole    Tablet 40 milliGRAM(s) Oral before breakfast  senna 2 Tablet(s) Oral at bedtime    MEDICATIONS  (PRN):  ALBUTerol/ipratropium for Nebulization 3 milliLiter(s) Nebulizer every 6 hours PRN Shortness of Breath and/or Wheezing      PHYSICAL EXAM:  Vital Signs Last 24 Hrs  T(C): 36.8 (03 Mar 2019 05:13), Max: 37 (02 Mar 2019 16:02)  T(F): 98.2 (03 Mar 2019 05:13), Max: 98.6 (02 Mar 2019 16:02)  HR: 73 (03 Mar 2019 05:13) (70 - 76)  BP: 110/60 (03 Mar 2019 05:13) (110/60 - 122/70)  BP(mean): --  RR: 18 (03 Mar 2019 05:13) (16 - 18)  SpO2: 97% (03 Mar 2019 05:13) (92% - 97%)    GENERAL: NAD, well-groomed, well-developed  HEAD:  Atraumatic, Normocephalic  NECK: Supple, No JVD  NERVOUS SYSTEM:  Alert & Oriented X3, Good concentration; Motor Strength 5/5 B/L upper and lower extremities, sensation intact  CHEST/LUNG: Clear to auscultation bilaterally, No rales, rhonchi, wheezing, or rubs  HEART: Regular rate and rhythm; s1 and s2 auscultated, No murmurs, rubs, or gallops  ABDOMEN: Soft, Nontender, Nondistended; Bowel sounds present and normoactive.   EXTREMITIES:  2+ Peripheral Pulses, No clubbing, cyanosis, or edema  SKIN: No rashes or lesions

## 2019-03-03 NOTE — PROGRESS NOTE ADULT - ATTENDING COMMENTS
patient's symptoms non-cardiac. Normal CPK with mildly elevated troponins.     No further cardiac workup.     DC planning.

## 2019-03-03 NOTE — PROGRESS NOTE ADULT - ASSESSMENT
62 y/o female with h/o HTN, COPD, schizoaffective disorder, hypothryoidism a/w NSTEMI. s/p RCA stent 3/1    NSTEMI, CAD  CP today. trop elevated possible residual. will repeat tomorrow am  EKG: same as before  c/w DAPT, BB, statin, ACEI  monitor cath site: no hematoma  cardio f/u appreciated.  c/w monitor on tele today  cardio fu     Epistaxis resolved      HTN- controlled    COPD- not in exacerbation. Duonebs PRN    Schizoaffective d/o- pt doesn't know what meds she is on/ if any.    hypomg: supplemented monitor    Dispo: needs SW eval. needs placement.

## 2019-03-03 NOTE — PROGRESS NOTE ADULT - SUBJECTIVE AND OBJECTIVE BOX
Dr. Arango Hospitalist Progress Note  VLADIMIR CHAPARRO [id number]    Patient is a 63y old  Female who presents with a chief complaint of cp (02 Mar 2019 09:00)    HPI:  This is a 62 y/o female with h/o HTN, COPD, schizoaffective disorder, hypothryoidism who presented to [facility] 2/27/19 with c/o chest pain.  She said the pain had been substernal and was initially stabbing for the past 3 weeks but increased in intensity the day she went to [facility].  She was admitted with NSTEMI and LHC revealed 40% pRCA eccentric hazy lesion with a large filling defect c/w thrombus with YAYA III flow in the distal vessel and 30% oLCX.  LV gram revealed EF 50% with moderate hypokinesis of the mid inferior wall.  Patient treated with heparin and integrilin infusion, plavix 300 mg load, baby asa, lipitor 80mg daily, metoprolol, and ACEI.  Patient transferred to [facility] for possible PCI with Dr. Mahan. (01 Mar 2019 14:50)    Interval: s/p RCA stebt 3/1, CP better after stent but mild discomfort over central chest this am without radiation associated with mild SOB.no palpitation/presyncope/syncope. no sweating, not feeling hot today, no tremor/diarrhoea/. no other symptoms. seen by cardio. placed on cardiac monitor.     tele: [facility]. otherwise no abn    homeless. pending SW eval. PT eval      ROS:  CONSTITUTIONAL:  No distress.no fever/chills  CARDIOVASCULAR:  as per HPI  RESPIRATORY:  + SOB. no wheezing. no cough or sputum.  No hemoptysis  GI: no abd pain, no nausea, no vomiting, no diarrhea, no constipation. No hematochezia or melena  EXT:no leg or calf pain  SKIN: no skin rash  CNS: No headaches. No weakness.no numbness.     Vital Signs Last 24 Hrs  T(C): 36.9 (03 Mar 2019 10:02), Max: 37 (02 Mar 2019 16:02)  T(F): 98.5 (03 Mar 2019 10:02), Max: 98.6 (02 Mar 2019 16:02)  HR: 68 (03 Mar 2019 10:02) (68 - 73)  BP: 122/74 (03 Mar 2019 10:02) (110/60 - 122/74)  BP(mean): --  RR: 18 (03 Mar 2019 05:13) (18 - 18)  SpO2: 97% (03 Mar 2019 05:13) (92% - 97%)      Physical Exam:  GENERAL: Not in distress. Alert    HEENT:  Normocephalic and atraumatic. no pallor  NECK: Supple.  No JVD.    CARDIOVASCULAR: RRR S1, S2. No murmur/rubs/gallop  LUNGS: BLAE+, no rales, no wheezing, no rhonchi.    ABDOMEN: ND. Soft,  NT, no guarding / rebound / rigidity.    EXTREMITIES: no cyanosis, no edema.   SKIN: no rash.   NEUROLOGIC: AAO*3.  PSYCHIATRIC: Calm.  No agitation.    Labs                                   15.0   5.2   )-----------( 482      ( 03 Mar 2019 09:40 )             47.2       03-03    136  |  99  |  14.0  ----------------------------<  108  4.7   |  26.0  |  0.78    Ca    9.4      03 Mar 2019 09:40  Mg     1.4     03-03    TPro  7.0  /  Alb  3.4  /  TBili  0.3<L>  /  DBili  0.1  /  AST  18  /  ALT  9   /  AlkPhos  79  03-02    CARDIAC MARKERS ( 03 Mar 2019 09:40 )  x     / 0.30 ng/mL / 50 U/L / x     / x          MEDICATIONS  (STANDING):  aspirin  chewable 81 milliGRAM(s) Chew daily  atorvastatin 80 milliGRAM(s) Oral at bedtime  clopidogrel Tablet 75 milliGRAM(s) Oral daily  levothyroxine 25 MICROGram(s) Oral daily  lisinopril 5 milliGRAM(s) Oral daily  magnesium sulfate  IVPB 2 Gram(s) IV Intermittent once  metoprolol tartrate 12.5 milliGRAM(s) Oral two times a day  pantoprazole    Tablet 40 milliGRAM(s) Oral before breakfast  senna 2 Tablet(s) Oral at bedtime    MEDICATIONS  (PRN):  ALBUTerol/ipratropium for Nebulization 3 milliLiter(s) Nebulizer every 6 hours PRN Shortness of Breath and/or Wheezing

## 2019-03-03 NOTE — PROGRESS NOTE ADULT - PROBLEM SELECTOR PLAN 1
Pt c/o chest pain this AM, at rest, non radiating.   EKG done showing persistent T wave inversion in III/AVF  CBC, BMP, Trop, CPK, Mg ordered  Plan discussed with Maribeth MCKEON

## 2019-03-03 NOTE — PROGRESS NOTE ADULT - ASSESSMENT
This is a 64 y/o female with h/o HTN, COPD, schizoaffective disorder, hypothryoidism who presented to [facility] 2/27/19 with c/o chest pain.  She said the pain had been substernal and was initially stabbing for the past 3 weeks but increased in intensity the day she went to [facility].  She was admitted with NSTEMI and LHC revealed 40% pRCA eccentric hazy lesion with a large filling defect c/w thrombus with YAYA III flow in the distal vessel and 30% oLCX.  LV gram revealed EF 50% with moderate hypokinesis of the mid inferior wall.  Patient treated with heparin and integrilin infusion, plavix 300 mg load, baby asa, lipitor 80mg daily, metoprolol, and ACEI.  Patient transferred to [facility] and is now s/p TERESA x 1 to RCA by Dr. Mahan. This AM pt c/o chest pain.

## 2019-03-04 VITALS — DIASTOLIC BLOOD PRESSURE: 80 MMHG | HEART RATE: 70 BPM | SYSTOLIC BLOOD PRESSURE: 122 MMHG

## 2019-03-04 LAB
ANION GAP SERPL CALC-SCNC: 11 MMOL/L — SIGNIFICANT CHANGE UP (ref 5–17)
BUN SERPL-MCNC: 22 MG/DL — HIGH (ref 8–20)
CALCIUM SERPL-MCNC: 9.7 MG/DL — SIGNIFICANT CHANGE UP (ref 8.6–10.2)
CHLORIDE SERPL-SCNC: 99 MMOL/L — SIGNIFICANT CHANGE UP (ref 98–107)
CO2 SERPL-SCNC: 28 MMOL/L — SIGNIFICANT CHANGE UP (ref 22–29)
CREAT SERPL-MCNC: 0.8 MG/DL — SIGNIFICANT CHANGE UP (ref 0.5–1.3)
GLUCOSE SERPL-MCNC: 89 MG/DL — SIGNIFICANT CHANGE UP (ref 70–115)
HCT VFR BLD CALC: 45.8 % — SIGNIFICANT CHANGE UP (ref 37–47)
HGB BLD-MCNC: 15 G/DL — SIGNIFICANT CHANGE UP (ref 12–16)
MAGNESIUM SERPL-MCNC: 1.9 MG/DL — SIGNIFICANT CHANGE UP (ref 1.6–2.6)
POTASSIUM SERPL-MCNC: 5 MMOL/L — SIGNIFICANT CHANGE UP (ref 3.5–5.3)
POTASSIUM SERPL-SCNC: 5 MMOL/L — SIGNIFICANT CHANGE UP (ref 3.5–5.3)
SODIUM SERPL-SCNC: 138 MMOL/L — SIGNIFICANT CHANGE UP (ref 135–145)
TROPONIN T SERPL-MCNC: 0.27 NG/ML — HIGH (ref 0–0.06)

## 2019-03-04 PROCEDURE — 80048 BASIC METABOLIC PNL TOTAL CA: CPT

## 2019-03-04 PROCEDURE — C1887: CPT

## 2019-03-04 PROCEDURE — 92978 ENDOLUMINL IVUS OCT C 1ST: CPT | Mod: RC

## 2019-03-04 PROCEDURE — 84484 ASSAY OF TROPONIN QUANT: CPT

## 2019-03-04 PROCEDURE — 84443 ASSAY THYROID STIM HORMONE: CPT

## 2019-03-04 PROCEDURE — 85014 HEMATOCRIT: CPT

## 2019-03-04 PROCEDURE — C1753: CPT

## 2019-03-04 PROCEDURE — 86900 BLOOD TYPING SEROLOGIC ABO: CPT

## 2019-03-04 PROCEDURE — 36415 COLL VENOUS BLD VENIPUNCTURE: CPT

## 2019-03-04 PROCEDURE — 83735 ASSAY OF MAGNESIUM: CPT

## 2019-03-04 PROCEDURE — 76937 US GUIDE VASCULAR ACCESS: CPT

## 2019-03-04 PROCEDURE — 80076 HEPATIC FUNCTION PANEL: CPT

## 2019-03-04 PROCEDURE — 93005 ELECTROCARDIOGRAM TRACING: CPT

## 2019-03-04 PROCEDURE — C1725: CPT

## 2019-03-04 PROCEDURE — 99239 HOSP IP/OBS DSCHRG MGMT >30: CPT

## 2019-03-04 PROCEDURE — 85027 COMPLETE CBC AUTOMATED: CPT

## 2019-03-04 PROCEDURE — C9606: CPT | Mod: RC

## 2019-03-04 PROCEDURE — 86850 RBC ANTIBODY SCREEN: CPT

## 2019-03-04 PROCEDURE — 85018 HEMOGLOBIN: CPT

## 2019-03-04 PROCEDURE — 82550 ASSAY OF CK (CPK): CPT

## 2019-03-04 PROCEDURE — 80061 LIPID PANEL: CPT

## 2019-03-04 PROCEDURE — 99152 MOD SED SAME PHYS/QHP 5/>YRS: CPT

## 2019-03-04 PROCEDURE — 97163 PT EVAL HIGH COMPLEX 45 MIN: CPT

## 2019-03-04 PROCEDURE — 93010 ELECTROCARDIOGRAM REPORT: CPT

## 2019-03-04 PROCEDURE — 85610 PROTHROMBIN TIME: CPT

## 2019-03-04 PROCEDURE — C1769: CPT

## 2019-03-04 PROCEDURE — C1874: CPT

## 2019-03-04 PROCEDURE — C1760: CPT

## 2019-03-04 PROCEDURE — C1894: CPT

## 2019-03-04 PROCEDURE — 86901 BLOOD TYPING SEROLOGIC RH(D): CPT

## 2019-03-04 PROCEDURE — 85730 THROMBOPLASTIN TIME PARTIAL: CPT

## 2019-03-04 PROCEDURE — 94640 AIRWAY INHALATION TREATMENT: CPT

## 2019-03-04 PROCEDURE — 99153 MOD SED SAME PHYS/QHP EA: CPT

## 2019-03-04 RX ORDER — METOPROLOL TARTRATE 50 MG
12.5 TABLET ORAL
Qty: 15 | Refills: 0 | OUTPATIENT
Start: 2019-03-04 | End: 2019-04-02

## 2019-03-04 RX ORDER — SENNA PLUS 8.6 MG/1
17.2 TABLET ORAL
Qty: 0 | Refills: 0 | COMMUNITY

## 2019-03-04 RX ORDER — CLOPIDOGREL BISULFATE 75 MG/1
1 TABLET, FILM COATED ORAL
Qty: 30 | Refills: 0 | OUTPATIENT
Start: 2019-03-04 | End: 2019-04-02

## 2019-03-04 RX ORDER — UMECLIDINIUM 62.5 UG/1
1 AEROSOL, POWDER ORAL
Qty: 1 | Refills: 0 | OUTPATIENT
Start: 2019-03-04

## 2019-03-04 RX ORDER — METOPROLOL TARTRATE 50 MG
12.5 TABLET ORAL
Qty: 0 | Refills: 0 | COMMUNITY

## 2019-03-04 RX ORDER — ASPIRIN/CALCIUM CARB/MAGNESIUM 324 MG
1 TABLET ORAL
Qty: 0 | Refills: 0 | COMMUNITY

## 2019-03-04 RX ORDER — ASPIRIN/CALCIUM CARB/MAGNESIUM 324 MG
1 TABLET ORAL
Qty: 30 | Refills: 0 | OUTPATIENT
Start: 2019-03-04 | End: 2019-04-02

## 2019-03-04 RX ORDER — LISINOPRIL 2.5 MG/1
1 TABLET ORAL
Qty: 0 | Refills: 0 | COMMUNITY

## 2019-03-04 RX ORDER — ATORVASTATIN CALCIUM 80 MG/1
1 TABLET, FILM COATED ORAL
Qty: 0 | Refills: 0 | COMMUNITY

## 2019-03-04 RX ORDER — LISINOPRIL 2.5 MG/1
1 TABLET ORAL
Qty: 30 | Refills: 0 | OUTPATIENT
Start: 2019-03-04 | End: 2019-04-02

## 2019-03-04 RX ORDER — SENNA PLUS 8.6 MG/1
2 TABLET ORAL
Qty: 20 | Refills: 0 | OUTPATIENT
Start: 2019-03-04

## 2019-03-04 RX ORDER — MAGNESIUM OXIDE 400 MG ORAL TABLET 241.3 MG
400 TABLET ORAL
Qty: 0 | Refills: 0 | Status: DISCONTINUED | OUTPATIENT
Start: 2019-03-04 | End: 2019-03-04

## 2019-03-04 RX ORDER — ALBUTEROL 90 UG/1
2 AEROSOL, METERED ORAL
Qty: 1 | Refills: 0 | OUTPATIENT
Start: 2019-03-04

## 2019-03-04 RX ORDER — ATORVASTATIN CALCIUM 80 MG/1
1 TABLET, FILM COATED ORAL
Qty: 30 | Refills: 0 | OUTPATIENT
Start: 2019-03-04 | End: 2019-04-02

## 2019-03-04 RX ADMIN — HEPARIN SODIUM 5000 UNIT(S): 5000 INJECTION INTRAVENOUS; SUBCUTANEOUS at 13:17

## 2019-03-04 RX ADMIN — Medication 81 MILLIGRAM(S): at 11:39

## 2019-03-04 RX ADMIN — CLOPIDOGREL BISULFATE 75 MILLIGRAM(S): 75 TABLET, FILM COATED ORAL at 11:40

## 2019-03-04 RX ADMIN — MAGNESIUM OXIDE 400 MG ORAL TABLET 400 MILLIGRAM(S): 241.3 TABLET ORAL at 11:56

## 2019-03-04 RX ADMIN — Medication 25 MICROGRAM(S): at 05:13

## 2019-03-04 RX ADMIN — LISINOPRIL 5 MILLIGRAM(S): 2.5 TABLET ORAL at 05:13

## 2019-03-04 RX ADMIN — PANTOPRAZOLE SODIUM 40 MILLIGRAM(S): 20 TABLET, DELAYED RELEASE ORAL at 05:13

## 2019-03-04 RX ADMIN — MAGNESIUM OXIDE 400 MG ORAL TABLET 400 MILLIGRAM(S): 241.3 TABLET ORAL at 17:58

## 2019-03-04 RX ADMIN — MAGNESIUM OXIDE 400 MG ORAL TABLET 400 MILLIGRAM(S): 241.3 TABLET ORAL at 08:24

## 2019-03-04 RX ADMIN — Medication 12.5 MILLIGRAM(S): at 05:13

## 2019-03-04 RX ADMIN — Medication 12.5 MILLIGRAM(S): at 17:58

## 2019-03-04 RX ADMIN — HEPARIN SODIUM 5000 UNIT(S): 5000 INJECTION INTRAVENOUS; SUBCUTANEOUS at 05:13

## 2019-03-04 NOTE — PROGRESS NOTE ADULT - ASSESSMENT
64 y/o female with h/o HTN, COPD, schizoaffective disorder, hypothryoidism a/w NSTEMI. s/p RCA stent 3/1    NSTEMI, CAD  CP+ constant since admission. trop coming down  EKG: same as before  c/w DAPT, BB, statin, ACEI  monitor cath site: no hematoma  DC tele or DG of not dced today  needs cardio fu and clearance for DC    Epistaxis resolved      HTN- controlled    COPD- not in exacerbation. Duonebs PRN    Schizoaffective d/o- pt doesn't know what meds she is on/ if any.    hypomg: supplemented monitor    Dispo: needs SW eval. needs placement. needs PT eval for safe DC 62 y/o female with h/o HTN, COPD, schizoaffective disorder, hypothryoidism a/w NSTEMI. s/p RCA stent 3/1    NSTEMI, CAD  CP+ constant since admission. trop coming down  EKG: same as before  c/w DAPT, BB, statin, ACEI  monitor cath site: no hematoma  DC tele or DG of not dced today  cardio cleared for DC    Epistaxis resolved      HTN- controlled    COPD- not in exacerbation. Duonebs PRN    Schizoaffective d/o- pt doesn't know what meds she is on/ if any.    hypomg: supplemented monitor    Dispo: needs SW eval. needs placement. needs PT eval for safe DC

## 2019-03-04 NOTE — PHYSICAL THERAPY INITIAL EVALUATION ADULT - ADDITIONAL COMMENTS
Pt states lives in a private home. 3 steps to enter with handrails, no steps inside. Pt was independent PTA with [facility]. Pt owns [employer] only. Per RN, pt lives in a [facility] house.

## 2019-03-04 NOTE — PROGRESS NOTE ADULT - SUBJECTIVE AND OBJECTIVE BOX
SUBJECTIVE:  Cardiology NP F/U note: called by RN , pt complaining of constant chest pressure over 24 hrs that did not go away.   pt claims " heaviness" in chest , thinks its related to her COPD and coughing  denies complaints of /sob/dizziness/palps  admits to productive cough   OOB ambulating/ kathy diet      	  MEDICATIONS:  lisinopril 5 milliGRAM(s) Oral daily  metoprolol tartrate 12.5 milliGRAM(s) Oral two times a day  ALBUTerol/ipratropium for Nebulization 3 milliLiter(s) Nebulizer every 6 hours PRN  pantoprazole    Tablet 40 milliGRAM(s) Oral before breakfast  senna 2 Tablet(s) Oral at bedtime  atorvastatin 80 milliGRAM(s) Oral at bedtime  levothyroxine 25 MICROGram(s) Oral daily  aspirin  chewable 81 milliGRAM(s) Chew daily  clopidogrel Tablet 75 milliGRAM(s) Oral daily  heparin  Injectable 5000 Unit(s) SubCutaneous every 8 hours  magnesium oxide 400 milliGRAM(s) Oral three times a day with meals        PHYSICAL EXAM:    T(C): 36.6 (19 @ 05:10), Max: 36.9 (19 @ 10:02)  HR: 69 (19 @ 05:10) (68 - 74)  BP: 110/74 (19 @ 05:10) (110/74 - 122/74)  RR: 14 (19 @ 05:10) (14 - 18)  SpO2: 98% (19 @ 05:10) (94% - 98%)  Wt(kg): --    I&O's Summary    03 Mar 2019 07:01  -  04 Mar 2019 07:00  --------------------------------------------------------  IN: 480 mL / OUT: 2 mL / NET: 478 mL        Daily     Daily Weight in k.9 (04 Mar 2019 03:53)    Appearance: Normal	  HEENT:   Normal oral mucosa,   Lymphatic: No lymphadenopathy  Cardiovascular: Normal S1 S2,RRR 74 , No murmurs, No edema  Respiratory: diminished but clear  Psychiatry: A & O x 3, Mood & affect appropriate  Gastrointestinal:  Soft, Non-tender, + BS	  Skin: warm and dry  Neurologic: Non-focal  Extremities: Normal range of motion,:  Vascular: Peripheral pulses palpable 2+ bilaterally    TELEMETRY: RSR 70's no events on tele   ECG:  	RSR 72 flipped Ts; II AVF / unchanged from previous   RADIOLOGY:   DIAGNOSTIC TESTING:  [ ] Echocardiogram: from [facility] ; Nl LVF   [X]  Catheterization: official pending  TERESA-RCA    [ ] Stress Test:    OTHER: 	    LABS:	 	    CARDIAC MARKERS: Positive                                   15.0   x     )-----------( x        ( 04 Mar 2019 05:34 )             45.8     03-04    138  |  99  |  22.0<H>  ----------------------------<  89  5.0   |  28.0  |  0.80    Ca    9.7      04 Mar 2019 05:34  Mg     1.9     03-04    ASSESSMENT:  63 F transfer from [facility] with c/o chest pain found to have NSTEMI   HX: HTN/COPD/ Schizoaffective disorder/hypothyroidism /  new CAD with stent  [facility] 3/1/19 TERESA Xx1 to RCA official report pending .   Hemodynamically stable with Chest pressure that is most likely related to cough and bronchitis     Plan:  continue current meds / ASA/ plavix/BB/ statin / ACE  cough meds prn / may d/c tele   discharge planning

## 2019-03-04 NOTE — PHYSICAL THERAPY INITIAL EVALUATION ADULT - PERTINENT HX OF CURRENT PROBLEM, REHAB EVAL
62 y/o female with h/o HTN, COPD, schizoaffective disorder, hypothryoidism who presented to [facility] 2/27/19 with c/o chest pain.. admitted with NSTEMI and LHC revealed 40% pRCA . Patient transferred to [facility] for possible PCI. Now s/p RCA stent 3/1

## 2019-03-04 NOTE — PROGRESS NOTE ADULT - SUBJECTIVE AND OBJECTIVE BOX
Dr. Arango Hospitalist Progress Note  VLADIMIR CHAPARRO [id number]    Patient is a 63y old  Female who presents with a chief complaint of cp (02 Mar 2019 09:00)    HPI:  This is a 64 y/o female with h/o HTN, COPD, schizoaffective disorder, hypothryoidism who presented to [facility] 2/27/19 with c/o chest pain.  She said the pain had been substernal and was initially stabbing for the past 3 weeks but increased in intensity the day she went to [facility].  She was admitted with NSTEMI and LHC revealed 40% pRCA eccentric hazy lesion with a large filling defect c/w thrombus with YAYA III flow in the distal vessel and 30% oLCX.  LV gram revealed EF 50% with moderate hypokinesis of the mid inferior wall.  Patient treated with heparin and integrilin infusion, plavix 300 mg load, baby asa, lipitor 80mg daily, metoprolol, and ACEI.  Patient transferred to [facility] for possible PCI with Dr. Mahan. (01 Mar 2019 14:50)    Interval: s/p RCA stebt 3/1, CP better after stent but mild discomfort over central chest constant for past couple of days without radiation, not associated with mild SOB. no palpitation/presyncope/syncope. no sweating, not feeling hot today, no tremor/diarrhoea/. no other symptoms. seen by cardio. placed on cardiac monitor. Tele: no event. trop coming down    tele: [facility]. otherwise no abn    homeless. pending SW eval. PT eval      ROS:  CONSTITUTIONAL:  No distress.no fever/chills  CARDIOVASCULAR:  as per HPI  RESPIRATORY:  + SOB. no wheezing. no cough or sputum.  No hemoptysis  GI: no abd pain, no nausea, no vomiting, no diarrhea, no constipation. No hematochezia or melena  EXT:no leg or calf pain  SKIN: no skin rash  CNS: No headaches. No weakness.no numbness.     Vital Signs Last 24 Hrs  T(C): 36.6 (04 Mar 2019 05:10), Max: 36.9 (03 Mar 2019 10:02)  T(F): 97.9 (04 Mar 2019 05:10), Max: 98.5 (03 Mar 2019 10:02)  HR: 69 (04 Mar 2019 05:10) (68 - 74)  BP: 110/74 (04 Mar 2019 05:10) (110/74 - 122/74)  BP(mean): --  RR: 14 (04 Mar 2019 05:10) (14 - 18)  SpO2: 98% (04 Mar 2019 05:10) (94% - 98%)      Physical Exam:  GENERAL: Not in distress. Alert    HEENT:  Normocephalic and atraumatic. no pallor  NECK: Supple.  No JVD.    CARDIOVASCULAR: RRR S1, S2. No murmur/rubs/gallop  LUNGS: BLAE+, no rales, no wheezing, no rhonchi.    ABDOMEN: ND. Soft,  NT, no guarding / rebound / rigidity.    EXTREMITIES: no cyanosis, no edema.   SKIN: no rash.   NEUROLOGIC: AAO*3.  PSYCHIATRIC: Calm.  No agitation.    Labs                                              15.0   x     )-----------( x        ( 04 Mar 2019 05:34 )             45.8       03-04    138  |  99  |  22.0<H>  ----------------------------<  89  5.0   |  28.0  |  0.80    Ca    9.7      04 Mar 2019 05:34  Mg     1.9     03-04       MEDICATIONS  (STANDING):  aspirin  chewable 81 milliGRAM(s) Chew daily  atorvastatin 80 milliGRAM(s) Oral at bedtime  clopidogrel Tablet 75 milliGRAM(s) Oral daily  heparin  Injectable 5000 Unit(s) SubCutaneous every 8 hours  levothyroxine 25 MICROGram(s) Oral daily  lisinopril 5 milliGRAM(s) Oral daily  magnesium oxide 400 milliGRAM(s) Oral three times a day with meals  metoprolol tartrate 12.5 milliGRAM(s) Oral two times a day  pantoprazole    Tablet 40 milliGRAM(s) Oral before breakfast  senna 2 Tablet(s) Oral at bedtime    MEDICATIONS  (PRN):  ALBUTerol/ipratropium for Nebulization 3 milliLiter(s) Nebulizer every 6 hours PRN Shortness of Breath and/or Wheezing  guaiFENesin    Syrup 200 milliGRAM(s) Oral every 6 hours PRN Cough

## 2020-03-19 ENCOUNTER — OUTPATIENT (OUTPATIENT)
Dept: OUTPATIENT SERVICES | Facility: HOSPITAL | Age: 65
LOS: 1 days | Discharge: HOME | End: 2020-03-19

## 2020-03-19 DIAGNOSIS — Z90.721 ACQUIRED ABSENCE OF OVARIES, UNILATERAL: Chronic | ICD-10-CM

## 2020-03-19 DIAGNOSIS — Z98.890 OTHER SPECIFIED POSTPROCEDURAL STATES: Chronic | ICD-10-CM

## 2020-03-19 PROBLEM — F11.20 OPIOID DEPENDENCE, UNCOMPLICATED: Chronic | Status: ACTIVE | Noted: 2019-03-01

## 2020-03-19 PROBLEM — J44.9 CHRONIC OBSTRUCTIVE PULMONARY DISEASE, UNSPECIFIED: Chronic | Status: ACTIVE | Noted: 2019-03-01

## 2020-03-19 PROBLEM — F25.9 SCHIZOAFFECTIVE DISORDER, UNSPECIFIED: Chronic | Status: ACTIVE | Noted: 2019-03-01

## 2020-03-19 PROBLEM — I10 ESSENTIAL (PRIMARY) HYPERTENSION: Chronic | Status: ACTIVE | Noted: 2019-03-01

## 2020-03-19 PROBLEM — K29.70 GASTRITIS, UNSPECIFIED, WITHOUT BLEEDING: Chronic | Status: ACTIVE | Noted: 2019-03-01

## 2020-03-19 PROBLEM — F11.10 OPIOID ABUSE, UNCOMPLICATED: Chronic | Status: ACTIVE | Noted: 2019-03-01

## 2020-03-19 PROBLEM — E11.9 TYPE 2 DIABETES MELLITUS WITHOUT COMPLICATIONS: Chronic | Status: ACTIVE | Noted: 2019-03-01

## 2020-03-19 PROBLEM — F32.9 MAJOR DEPRESSIVE DISORDER, SINGLE EPISODE, UNSPECIFIED: Chronic | Status: ACTIVE | Noted: 2019-03-01

## 2020-03-19 PROBLEM — E03.9 HYPOTHYROIDISM, UNSPECIFIED: Chronic | Status: ACTIVE | Noted: 2019-03-01

## 2020-03-20 DIAGNOSIS — F11.99 OPIOID USE, UNSPECIFIED WITH UNSPECIFIED OPIOID-INDUCED DISORDER: ICD-10-CM

## 2022-11-15 ENCOUNTER — OUTPATIENT (OUTPATIENT)
Dept: OUTPATIENT SERVICES | Facility: HOSPITAL | Age: 67
LOS: 1 days | Discharge: HOME | End: 2022-11-15

## 2022-11-15 DIAGNOSIS — Z90.721 ACQUIRED ABSENCE OF OVARIES, UNILATERAL: Chronic | ICD-10-CM

## 2022-11-15 DIAGNOSIS — Z98.890 OTHER SPECIFIED POSTPROCEDURAL STATES: Chronic | ICD-10-CM

## 2022-12-28 DIAGNOSIS — F31.9 BIPOLAR DISORDER, UNSPECIFIED: ICD-10-CM

## 2022-12-28 DIAGNOSIS — F11.20 OPIOID DEPENDENCE, UNCOMPLICATED: ICD-10-CM

## 2023-03-16 ENCOUNTER — OUTPATIENT (OUTPATIENT)
Dept: OUTPATIENT SERVICES | Facility: HOSPITAL | Age: 68
LOS: 1 days | Discharge: HOME | End: 2023-03-16

## 2023-03-16 DIAGNOSIS — Z90.721 ACQUIRED ABSENCE OF OVARIES, UNILATERAL: Chronic | ICD-10-CM

## 2023-03-16 DIAGNOSIS — Z98.890 OTHER SPECIFIED POSTPROCEDURAL STATES: Chronic | ICD-10-CM

## 2023-03-17 DIAGNOSIS — F11.21 OPIOID DEPENDENCE, IN REMISSION: ICD-10-CM

## 2023-06-22 PROBLEM — Z00.00 ENCOUNTER FOR PREVENTIVE HEALTH EXAMINATION: Status: ACTIVE | Noted: 2023-06-22

## 2023-06-26 PROBLEM — R91.1 PULMONARY NODULE, RIGHT: Status: ACTIVE | Noted: 2023-06-26

## 2023-06-27 ENCOUNTER — APPOINTMENT (OUTPATIENT)
Dept: THORACIC SURGERY | Facility: HOSPITAL | Age: 68
End: 2023-06-27

## 2023-06-27 DIAGNOSIS — R91.1 SOLITARY PULMONARY NODULE: ICD-10-CM

## 2023-06-30 NOTE — HISTORY OF PRESENT ILLNESS
[FreeTextEntry1] : Ms. VLADIMIR CHAPARRO, 68 year old female, ...smoker, w/ hx of .....\par \par Referred by Dr. Banda. \par \par s/p biopsy on 5/21/23\par \par CT Chest on 5/23/23 \par - RML lobulated nodule: 1.2 x 0.9 cm, now containing a biopsy clip (2, 53); O 5/3/23, it was measured as 1.4 x 1.4 cm. Mild groundglass airspace disease now surrounds this nodule\par - Indeterminate left adrenal mass 2.2 x 1.8 cm. Essentially stable since previous study. \par \par Patient presents today for CTSX consultation.

## 2023-07-05 ENCOUNTER — APPOINTMENT (OUTPATIENT)
Dept: THORACIC SURGERY | Facility: CLINIC | Age: 68
End: 2023-07-05